# Patient Record
Sex: FEMALE | Race: OTHER | Employment: UNEMPLOYED | URBAN - METROPOLITAN AREA
[De-identification: names, ages, dates, MRNs, and addresses within clinical notes are randomized per-mention and may not be internally consistent; named-entity substitution may affect disease eponyms.]

---

## 2017-09-08 ENCOUNTER — HOSPITAL ENCOUNTER (EMERGENCY)
Age: 32
Discharge: HOME | End: 2017-09-09
Attending: EMERGENCY MEDICINE | Admitting: EMERGENCY MEDICINE
Payer: SELF-PAY

## 2017-09-08 DIAGNOSIS — R42 DIZZINESS: ICD-10-CM

## 2017-09-08 DIAGNOSIS — R20.2 PARESTHESIA: ICD-10-CM

## 2017-09-08 DIAGNOSIS — R07.9 RIGHT-SIDED CHEST PAIN: Primary | ICD-10-CM

## 2017-09-08 DIAGNOSIS — R11.2 NON-INTRACTABLE VOMITING WITH NAUSEA, UNSPECIFIED VOMITING TYPE: ICD-10-CM

## 2017-09-08 LAB
ALBUMIN SERPL-MCNC: 3.1 G/DL (ref 3.5–5)
ALBUMIN/GLOB SERPL: 0.8 {RATIO} (ref 1.1–2.2)
ALP SERPL-CCNC: 53 U/L (ref 45–117)
ALT SERPL-CCNC: 21 U/L (ref 12–78)
ANION GAP SERPL CALC-SCNC: 7 MMOL/L (ref 5–15)
APPEARANCE UR: ABNORMAL
AST SERPL-CCNC: 9 U/L (ref 15–37)
BACTERIA URNS QL MICRO: NEGATIVE /HPF
BASOPHILS # BLD: 0 K/UL (ref 0–0.1)
BASOPHILS NFR BLD: 0 % (ref 0–1)
BILIRUB SERPL-MCNC: 0.4 MG/DL (ref 0.2–1)
BILIRUB UR QL: NEGATIVE
BUN SERPL-MCNC: 10 MG/DL (ref 6–20)
BUN/CREAT SERPL: 17 (ref 12–20)
CALCIUM SERPL-MCNC: 8.6 MG/DL (ref 8.5–10.1)
CHLORIDE SERPL-SCNC: 102 MMOL/L (ref 97–108)
CO2 SERPL-SCNC: 29 MMOL/L (ref 21–32)
COLOR UR: ABNORMAL
CREAT SERPL-MCNC: 0.6 MG/DL (ref 0.55–1.02)
EOSINOPHIL # BLD: 0.1 K/UL (ref 0–0.4)
EOSINOPHIL NFR BLD: 1 % (ref 0–7)
EPITH CASTS URNS QL MICRO: ABNORMAL /LPF
ERYTHROCYTE [DISTWIDTH] IN BLOOD BY AUTOMATED COUNT: 13.2 % (ref 11.5–14.5)
GLOBULIN SER CALC-MCNC: 3.7 G/DL (ref 2–4)
GLUCOSE SERPL-MCNC: 187 MG/DL (ref 65–100)
GLUCOSE UR STRIP.AUTO-MCNC: >1000 MG/DL
HCT VFR BLD AUTO: 35.4 % (ref 35–47)
HGB BLD-MCNC: 12.2 G/DL (ref 11.5–16)
HGB UR QL STRIP: NEGATIVE
HYALINE CASTS URNS QL MICRO: ABNORMAL /LPF (ref 0–5)
KETONES UR QL STRIP.AUTO: ABNORMAL MG/DL
LEUKOCYTE ESTERASE UR QL STRIP.AUTO: NEGATIVE
LIPASE SERPL-CCNC: 112 U/L (ref 73–393)
LYMPHOCYTES # BLD: 3.1 K/UL (ref 0.8–3.5)
LYMPHOCYTES NFR BLD: 26 % (ref 12–49)
MCH RBC QN AUTO: 27.7 PG (ref 26–34)
MCHC RBC AUTO-ENTMCNC: 34.5 G/DL (ref 30–36.5)
MCV RBC AUTO: 80.3 FL (ref 80–99)
MONOCYTES # BLD: 0.8 K/UL (ref 0–1)
MONOCYTES NFR BLD: 7 % (ref 5–13)
NEUTS SEG # BLD: 7.6 K/UL (ref 1.8–8)
NEUTS SEG NFR BLD: 66 % (ref 32–75)
NITRITE UR QL STRIP.AUTO: NEGATIVE
PH UR STRIP: 6 [PH] (ref 5–8)
PLATELET # BLD AUTO: 288 K/UL (ref 150–400)
POTASSIUM SERPL-SCNC: 3.5 MMOL/L (ref 3.5–5.1)
PROT SERPL-MCNC: 6.8 G/DL (ref 6.4–8.2)
PROT UR STRIP-MCNC: NEGATIVE MG/DL
RBC # BLD AUTO: 4.41 M/UL (ref 3.8–5.2)
RBC #/AREA URNS HPF: ABNORMAL /HPF (ref 0–5)
SODIUM SERPL-SCNC: 138 MMOL/L (ref 136–145)
SP GR UR REFRACTOMETRY: 1.03 (ref 1–1.03)
UA: UC IF INDICATED,UAUC: ABNORMAL
UROBILINOGEN UR QL STRIP.AUTO: 1 EU/DL (ref 0.2–1)
WBC # BLD AUTO: 11.7 K/UL (ref 3.6–11)
WBC URNS QL MICRO: ABNORMAL /HPF (ref 0–4)

## 2017-09-08 PROCEDURE — 74011250636 HC RX REV CODE- 250/636: Performed by: EMERGENCY MEDICINE

## 2017-09-08 PROCEDURE — 93005 ELECTROCARDIOGRAM TRACING: CPT

## 2017-09-08 PROCEDURE — 83690 ASSAY OF LIPASE: CPT | Performed by: EMERGENCY MEDICINE

## 2017-09-08 PROCEDURE — 81025 URINE PREGNANCY TEST: CPT

## 2017-09-08 PROCEDURE — 96375 TX/PRO/DX INJ NEW DRUG ADDON: CPT

## 2017-09-08 PROCEDURE — 85025 COMPLETE CBC W/AUTO DIFF WBC: CPT | Performed by: EMERGENCY MEDICINE

## 2017-09-08 PROCEDURE — 96361 HYDRATE IV INFUSION ADD-ON: CPT

## 2017-09-08 PROCEDURE — 81001 URINALYSIS AUTO W/SCOPE: CPT | Performed by: EMERGENCY MEDICINE

## 2017-09-08 PROCEDURE — 80053 COMPREHEN METABOLIC PANEL: CPT | Performed by: EMERGENCY MEDICINE

## 2017-09-08 PROCEDURE — 99285 EMERGENCY DEPT VISIT HI MDM: CPT

## 2017-09-08 PROCEDURE — 36415 COLL VENOUS BLD VENIPUNCTURE: CPT | Performed by: EMERGENCY MEDICINE

## 2017-09-08 PROCEDURE — 96374 THER/PROPH/DIAG INJ IV PUSH: CPT

## 2017-09-08 RX ORDER — MECLIZINE HCL 12.5 MG 12.5 MG/1
25 TABLET ORAL
Status: COMPLETED | OUTPATIENT
Start: 2017-09-08 | End: 2017-09-08

## 2017-09-08 RX ORDER — KETOROLAC TROMETHAMINE 30 MG/ML
30 INJECTION, SOLUTION INTRAMUSCULAR; INTRAVENOUS
Status: COMPLETED | OUTPATIENT
Start: 2017-09-08 | End: 2017-09-08

## 2017-09-08 RX ORDER — ONDANSETRON 2 MG/ML
4 INJECTION INTRAMUSCULAR; INTRAVENOUS
Status: COMPLETED | OUTPATIENT
Start: 2017-09-08 | End: 2017-09-08

## 2017-09-08 RX ADMIN — KETOROLAC TROMETHAMINE 30 MG: 30 INJECTION, SOLUTION INTRAMUSCULAR at 23:14

## 2017-09-08 RX ADMIN — ONDANSETRON 4 MG: 2 INJECTION INTRAMUSCULAR; INTRAVENOUS at 23:14

## 2017-09-08 RX ADMIN — SODIUM CHLORIDE 1000 ML: 900 INJECTION, SOLUTION INTRAVENOUS at 23:17

## 2017-09-08 RX ADMIN — MECLIZINE 25 MG: 12.5 TABLET ORAL at 23:13

## 2017-09-09 ENCOUNTER — APPOINTMENT (OUTPATIENT)
Dept: GENERAL RADIOLOGY | Age: 32
End: 2017-09-09
Attending: EMERGENCY MEDICINE
Payer: SELF-PAY

## 2017-09-09 VITALS
RESPIRATION RATE: 16 BRPM | SYSTOLIC BLOOD PRESSURE: 141 MMHG | HEIGHT: 59 IN | HEART RATE: 94 BPM | OXYGEN SATURATION: 97 % | TEMPERATURE: 97.8 F | DIASTOLIC BLOOD PRESSURE: 79 MMHG | WEIGHT: 172.6 LBS | BODY MASS INDEX: 34.8 KG/M2

## 2017-09-09 LAB
ATRIAL RATE: 79 BPM
CALCULATED P AXIS, ECG09: 36 DEGREES
CALCULATED R AXIS, ECG10: 57 DEGREES
CALCULATED T AXIS, ECG11: -3 DEGREES
DIAGNOSIS, 93000: NORMAL
P-R INTERVAL, ECG05: 174 MS
Q-T INTERVAL, ECG07: 332 MS
QRS DURATION, ECG06: 82 MS
QTC CALCULATION (BEZET), ECG08: 380 MS
VENTRICULAR RATE, ECG03: 79 BPM

## 2017-09-09 PROCEDURE — 74011250636 HC RX REV CODE- 250/636: Performed by: EMERGENCY MEDICINE

## 2017-09-09 PROCEDURE — 71020 XR CHEST PA LAT: CPT

## 2017-09-09 RX ORDER — MORPHINE SULFATE 2 MG/ML
4 INJECTION, SOLUTION INTRAMUSCULAR; INTRAVENOUS
Status: COMPLETED | OUTPATIENT
Start: 2017-09-09 | End: 2017-09-09

## 2017-09-09 RX ORDER — ONDANSETRON 8 MG/1
8 TABLET, ORALLY DISINTEGRATING ORAL
Qty: 12 TAB | Refills: 0 | Status: SHIPPED | OUTPATIENT
Start: 2017-09-09 | End: 2017-09-16

## 2017-09-09 RX ORDER — MECLIZINE HYDROCHLORIDE 25 MG/1
25 TABLET ORAL
Qty: 30 TAB | Refills: 0 | Status: SHIPPED | OUTPATIENT
Start: 2017-09-09 | End: 2017-09-19

## 2017-09-09 RX ORDER — GLIPIZIDE 5 MG/1
5 TABLET ORAL 2 TIMES DAILY
COMMUNITY

## 2017-09-09 RX ADMIN — MORPHINE SULFATE 4 MG: 2 INJECTION, SOLUTION INTRAMUSCULAR; INTRAVENOUS at 00:43

## 2017-09-09 NOTE — DISCHARGE INSTRUCTIONS
Dolor de pecho: Instrucciones de cuidado - [ Chest Pain: Care Instructions ]  Instrucciones de cuidado  El dolor de pecho puede tener muchas causas. Algunas no son graves y mejorarán por sí solas en pocos días. Ha algunos tipos de dolor de pecho requieren más pruebas y [place]. Es posible que peters médico le haya recomendado devika visita de seguimiento dentro de las 8 a 12 horas siguientes. Si no mejora, es posible que necesite [address] pruebas o [place]. Aunque peters médico le haya dado de jaiden, es necesario que esté atento a cualquier problema que se presente. El médico le hizo un cuidadoso chequeo, ha a veces los problemas pueden aparecer posteriormente. Si tiene nuevos síntomas o éstos no mejoran, obtenga atención médica de inmediato. Si tiene dolor o presión en el pecho que empeora o es diferente y que dura más de 5 minutos, o se desmayó (perdió el conocimiento), llame al 911 o busque otra ayuda de emergencia de inmediato. Acudir a devika consulta médica es sólo un paso en peters tratamiento. Aunque se sienta mejor, todavía deberá hacer lo que peters médico le recomiende, amy asistir a todas las visitas de seguimiento sugeridas y munira los medicamentos exactamente [employer]	[place] fueron indicados. [place] le ayudará a recuperarse y prevenir problemas futuros. ¿Cómo puede cuidarse en el hogar? · Descanse hasta que se sienta mejor. · [place] mayank medicamentos exactamente amy le fueron recetados. Llame a peters médico si manas estar teniendo problemas con peters medicamento. · No conduzca después de munira un analgésico (medicamento para el dolor) recetado. ¿Cuándo debe pedir ayuda? Llame al 911 si:  · Se desmayó (perdió el conocimiento). · Tiene graves dificultades para respirar. · Tiene síntomas de un ataque al corazón. Estos podrían incluir:  ¨ Dolor o presión en el pecho, o devika sensación extraña en el pecho. ¨ Sudoración. ¨ Falta de aire. ¨ Náuseas o vómito.   ¨ Dolor, presión o devika sensación extraña en la espalda, el mimi, la mandíbula, la parte superior del abdomen o en garcia o ambos hombros o brazos. ¨ Aturdimiento o debilidad repentina. ¨ Latidos del corazón rápidos o irregulares. Después de llamar al 911, es posible que el operador le diga que mastique 1 aspirina para adultos o de 2 a 4 aspirinas de dosis baja. Espere a devika ambulancia. No intente conducir usted mismo. Llame hoy a peters médico si:  · Tiene cualquier dificultad para respirar. · El dolor en el pecho empeora. · Siente mareos o aturdimiento, o que está a punto de [place]. · No mejora amy se esperaba. · Tiene dolor de pecho nuevo o diferente. ¿Dónde puede encontrar más información en inglés? Victoria Hogan a http://tyron-mario.info/. Escriba A120 en la búsqueda para aprender más acerca de \"Dolor de pecho: Instrucciones de cuidado - [ Chest Pain: Care Instructions ]. \"  Revisado: 20 marzo, 2017  Versión del contenido: 11.3  © 7147-4691 Healthwise, Incorporated. Las instrucciones de cuidado fueron adaptadas bajo licencia por [employer] (which disclaims liability or warranty for this information). Si usted tiene [place] afección médica o sobre estas instrucciones, siempre pregunte a peters profesional de pedro. Healthwise, Incorporated niega toda garantía o responsabilidad por peters uso de esta información. Dolor abdominal: Instrucciones de cuidado - [ Abdominal Pain: Care Instructions ]  Instrucciones de cuidado    El dolor abdominal tiene muchas causas posibles. Algunas de ellas no son graves y mejoran por sí solas en unos días. Otras requieren Rhina Sean y [place]. Si peters dolor continúa o [place], necesitará devika nueva revisión y [place] pruebas para determinar qué pasa. Es posible que necesite cirugía para corregir el problema. No ignore nuevos síntomas, amy fiebre, náuseas y [place], [facility], dolor que MAYTE o Mg. Podrían ser señales de un problema más grave.   Peters médico puede haberle recomendado devika consulta de Rafat Rowlandn las 8 o 12 horas siguientes. Si no se siente mejor, es posible que requiera [address] o [address] El médico lo burgos revisado minuciosamente, sybil puede kindra problemas más tarde. Si nota algún problema o síntomas nuevos, busque tratamiento médico inmediatamente. La atención de seguimiento es devika parte clave de peters tratamiento y seguridad. Asegúrese de hacer y acudir a todas las citas, y llame a peters médico si está teniendo problemas. También es devika buena idea saber los resultados de los exámenes y mantener devika lista de los medicamentos que margaret. ¿Cómo puede cuidarse en el hogar? · Descanse hasta que se sienta mejor. · Para prevenir la deshidratación, wendy abundantes líquidos, suficientes para que peters orina sea de color amarillo soren o transparente amy el agua. Elija beber agua y otros líquidos florida sin cafeína hasta que se sienta mejor. Si tiene [facility], del corazón o del hígado y tiene que Charmaine's líquidos, hable con peters médico antes de aumentar peters consumo. · Si tiene [employer], coma alimentos suaves, amy arroz, pan nii seco o galletas saladas, bananas (plátanos) y puré de [place]. Trate de comer varias comidas pequeñas al día en lugar de dos o bud grandes. · Espere hasta 48 horas después de que todos los síntomas hayan desaparecido antes de comer alimentos condimentados, alcohol y bebidas que contengan cafeína. · No consuma alimentos ricos en grasa. · Evite medicamentos antiinflamatorios amy aspirina, ibuprofeno (Advil, Motrin) y naproxeno (Aleve). Pueden causar [employer]. Dígale a peters médico si está tomando aspirina diariamente debido a otro problema de pedro. ¿Cuándo debe pedir ayuda? Llame al 911 en cualquier momento que considere que necesita atención de emergencia. Por ejemplo, llame si:  · Se desmayó (perdió el conocimiento).   · [address] son de color rojizo o muy sanguinolentas (con tripp). · Vomita tripp o algo parecido a granos de café molido. · Tiene dolor abdominal nuevo e intenso. Llame a peters médico ahora mismo o busque atención médica inmediata si:  · Peters dolor empeora, sobre todo si se concentra en devika aydee parte del vientre. · Vuelve a tener fiebre o tiene fiebre más jaiden. · Ruth heces son negruzcas y parecidas al alquitrán o tienen rastros de [place]. · Tiene sangrado vaginal inesperado. · Tiene síntomas de devika infección del tracto urinario. Estos podrían incluir:  ¨ Dolor al [place]. ¨ Orinar con más frecuencia que lo habitual.  ¨ Tripp en la [place]. · Siente mareos o aturdimiento, o que está a punto de [place]. Preste especial atención a los cambios en peters pedro y asegúrese de comunicarse con peters médico si:  · No está mejorando después de 1 día (24 horas). ¿Dónde puede encontrar más información en inglés? Sasha Almaraz a http://tyron-mario.info/. Meseret Casarez [id number] en la búsqueda para aprender más acerca de \"Dolor abdominal: Instrucciones de cuidado - [ Abdominal Pain: Care Instructions ]. \"  Revisado: 2017  Versión del contenido: 11.3  © 1454-9995 Healthwise, Incorporated. Las instrucciones de cuidado fueron adaptadas bajo licencia por [employer] (which disclaims liability or warranty for this information). Si usted tiene [place] afección médica o sobre estas instrucciones, siempre pregunte a peters profesional de pedro. Healthwise, Incorporated niega toda garantía o responsabilidad por peters uso de esta información. Mareos: Instrucciones de cuidado - [ Dizziness: Care Instructions ]  Instrucciones de cuidado  Los mareos son [place] sensación de inestabilidad o confusión en la clarisa. Son distintos al vértigo, devika sensación de que la habitación gira o de que usted se mueve o . También es distinto del aturdimiento, que es la sensación de que está a punto de desmayarse. Puede resultar difícil conocer la causa de los [place].  [place] personas se sienten mareadas cuando tienen migrañas. A veces, los episodios gripales pueden hacer que se sienta mareado. Algunas afecciones médicas, amy los problemas cardíacos o la presión arterial jaiden, pueden hacer que se sienta mareado. Muchos medicamentos pueden causar mareos, amy los que se usan para la presión arterial jaiden, el dolor o la ansiedad. Si es un medicamento el que está causando los síntomas, peters médico podría recomendarle que lo cambie o deje de tomarlo. Si es un problema cardíaco, podría necesitar medicamentos para ayudar a que peters corazón funcione mejor. Si no hay razón aparente para los síntomas, peters médico podría sugerir vigilar y esperar michelle un tiempo para tacos si los mareos desaparecen por sí solos. La atención de seguimiento es devika parte clave de peters tratamiento y seguridad. Asegúrese de hacer y acudir a todas las citas, y llame a peters médico si está teniendo problemas. También es devika buena idea saber los resultados de mayank exámenes y mantener devika lista de los medicamentos que margaret. ¿Cómo puede cuidarse en el hogar? · Si peters médico le recomienda o receta medicamentos, tómelos exactamente según las indicaciones. Llame a peters médico si manas estar teniendo un problema con peters medicamento. · No conduzca mientras se sienta mareado. · Trate de prevenir las caídas. Algunas medidas que puede munira son:  Carolyn Patee Usar tapetes antideslizantes, agregar agarraderas cerca de la amanda y usar luces nocturnas. ¨ Ordenar peters casa de minnie manera que en los senderos no haya nada con lo que se pueda tropezar. ¨ Avisarles a familiares y [facility] que se ha estado sintiendo [place]. [place] les servirá para saber cómo ayudarle. ¿Cuándo debe pedir ayuda? Llame al 911 en cualquier momento que considere que necesita atención de [place]. Por ejemplo, llame si:  · Se desmayó (perdió el conocimiento).   · Tiene mareos junto con síntomas de un ataque cardíaco. Estos pueden incluir:  ¨ Dolor de pecho, o presión o devika sensación extraña en el pecho. ¨ Sudoración. ¨ Falta de aire. ¨ Náuseas o vómito. ¨ Dolor, presión, o devika sensación extraña en la espalda, el mimi, la mandíbula o el abdomen superior, o en garcia o ambos hombros o brazos. ¨ Aturdimiento o debilidad repentina. ¨ Un latido cardíaco rápido o irregular. · Tiene síntomas de un ataque cerebral. Estos pueden incluir:  ¨ Entumecimiento, hormigueo, debilidad o parálisis repentinos en la irasema, el brazo o la pierna, sobre todo si ocurre en un solo lado del cuerpo. ¨ Cambios súbitos en la vista. ¨ Problemas repentinos para hablar. ¨ Confusión súbita o dificultad repentina para comprender frases sencillas. ¨ Problemas repentinos para caminar o mantener el equilibrio. ¨ Un dolor de clarisa intenso y repentino, distinto a los jyoti de clarisa anteriores. Llame a peters médico ahora mismo o busque atención médica inmediata si:  · Se siente mareado y [facility], dolor [facility] o zumbido [email or web address] oídos. · Tiene nuevas náuseas y vómito o [address] · Ruth mareos no desaparecen o regresan. Preste especial atención a los cambios en peters pedro y asegúrese de comunicarse con peters médico si:  · No mejora amy se esperaba. ¿Dónde puede encontrar más información en inglés? Stepan Carlos a http://tyron-mario.info/. Brianda Puckett [id number] en la búsqueda para aprender más acerca de \"Mareos: Instrucciones de cuidado - [ Dizziness: Care Instructions ]. \"  Revisado: 20 marzo, 2017  Versión del contenido: 11.3  © 6357-4928 [employer]. Las instrucciones de cuidado fueron adaptadas bajo licencia por [employer] (which disclaims liability or warranty for this information). Si usted tiene [place] afección médica o sobre estas instrucciones, siempre pregunte a peters profesional de pedro. [employer] niega toda garantía o responsabilidad por peters uso de esta información.        Náuseas y vómito: Instrucciones de cuidado - [ Nausea and Vomiting: Care Instructions ]  Instrucciones de cuidado    Cuando tiene náuseas, podría sentirse débil y sudoroso y notar mucha saliva en peters boca. Las náuseas suelen [employer]. La mayoría de las veces no hay que preocuparse por las náuseas y [address] [facility], sybil estos pueden ser signos de [facility]. Dos causas comunes de náuseas y vómito son la gastroenteritis viral y la intoxicación por alimentos. Las náuseas y el vómito por gastroenteritis viral, por lo general, empiezan a mejorar en unas 24 horas. Las náuseas y el vómito debido a intoxicación por alimentos podrían durar de 12 a 48 horas. El médico lo ha revisado minuciosamente, sybil puede desarrollar problemas más tarde. Si nota algún problema o síntomas nuevos, busque tratamiento médico inmediatamente. La atención de seguimiento es devika parte clave de peters tratamiento y seguridad. Asegúrese de hacer y acudir a todas las citas, y llame a peters médico si está teniendo problemas. También es devika buena idea saber los resultados de los exámenes y mantener devika lista de los medicamentos que margaret. ¿Cómo puede cuidarse en el hogar? · Para prevenir la deshidratación, wendy abundantes líquidos, suficientes para que peters orina sea de color amarillo soren o sara amy el agua. Opte por beber agua y otros líquidos florida sin cafeína hasta que se sienta mejor. Si tiene devika enfermedad del riñón, del corazón o del hígado y tiene que [place]'s líquidos, hable con peters médico antes de aumentar peters consumo. · Permanezca en la cama hasta que se sienta mejor. · Cuando pueda comer, empiece a consumir sopas claras (caldos), alimentos suaves y líquidos hasta que todos los síntomas hayan desaparecido por un período de 12 a 48 horas. Otras elecciones buenas incluyen pan nii seco, galletas saladas, cereal cocido y postre de gelatina, amy Jell-O.  ¿Cuándo debe pedir ayuda? Llame al 911 toda vez que piense que puede necesitar atención de emergencia.  Por ejemplo, llame si:  · Se desmayó (perdió el conocimiento). Llame a peters médico ahora mismo o busque atención médica inmediata si:  · Tiene síntomas de deshidratación, tales amy:  ¨ Ojos secos y boca seca. ¨ Orina solo poca cantidad de color oscuro. ¨ Tiene más sed de lo normal.  · Tiene dolor abdominal nuevo o que empeora. · Tiene fiebre nueva o que aumenta. · Vomita tripp o algo parecido a granos de café molido. Preste especial atención a los cambios en peters pedro y asegúrese de comunicarse con peters médico si:  · Tiene náusea y vómito continuos. · El vómito está empeorando. · El vómito dura más de 2 días. · No mejora amy se esperaba. ¿Dónde puede encontrar más información en inglés? Nyla Cabrales a http://tyron-mario.info/. Magi Messina [id number] en la búsqueda para aprender más acerca de \"Náuseas y vómito: Instrucciones de cuidado - [ Nausea and Vomiting: Care Instructions ]. \"  Revisado: 20 marzo, 2017  Versión del contenido: 11.3  © 9375-0482 Healthwise, Incorporated. Las instrucciones de cuidado fueron adaptadas bajo licencia por [employer] (which disclaims liability or warranty for this information). Si usted tiene [place] afección médica o sobre estas instrucciones, siempre pregunte a peters profesional de pedro. Healthwise, Incorporated niega toda garantía o responsabilidad por peters uso de esta información. Entumecimiento y hormigueo: Instrucciones de cuidado - [ Numbness and Tingling: Care Instructions ]  Instrucciones de cuidado  Muchas cosas pueden causar entumecimiento u hormigueo. Devika hinchazón puede ejercer presión [employer]. [place] podría causarle pérdida de sensación o que tenga devika sensación de hormigueo en parte de peters cuerpo. Los nervios pueden ser dañados por traumatismos, toxinas o enfermedades amy diabetes o esclerosis múltiple (MS, por mayank siglas en inglés). Sin embargo, algunas veces la causa no está sara.   Si no hay razón aparente para los síntomas, y no tiene ningún [address] médico podría sugerir vigilar y esperar michelle un tiempo para tacos si el entumecimiento o el hormigueo desaparecen por sí solos. Es posible que peters médico quiera que usted se geni análisis de tripp o pruebas de los nervios para encontrar la causa de mayank síntomas. La atención de seguimiento es devika parte clave de peters tratamiento y seguridad. Asegúrese de hacer y acudir a todas las citas, y llame a peters médico si está teniendo problemas. También es devika buena idea saber los resultados de mayank exámenes y mantener devika lista de los medicamentos que margaret. ¿Cómo puede cuidarse en el hogar? · Si peters médico le receta medicamentos, tómelos exactamente según las indicaciones. Llame a peters médico si manas estar teniendo un problema con mayank medicamentos. · Si tiene alguna hinchazón, colóquese hielo o devika compresa fría en la perico de 10 a 20 minutos por vez. Póngase un paño moralez entre el hielo y la piel. ¿Cuándo debe pedir ayuda? Llame al 911 en cualquier momento que considere que necesita atención de [place]. Por ejemplo, llame si:  · Tiene debilidad, entumecimiento u hormigueo en ambas piernas. · Pierde el control de los intestinos o de la vejiga. · Tiene síntomas de un ataque cerebral. Estos pueden incluir:  ¨ Entumecimiento, hormigueo, debilidad o parálisis repentinos en la irasema, el brazo o la pierna, sobre todo si ocurre en un solo lado del cuerpo. ¨ Cambios súbitos en la vista. ¨ Problemas repentinos para hablar. ¨ Confusión súbita o dificultad repentina para comprender frases sencillas. ¨ Problemas repentinos para caminar o mantener el equilibrio. ¨ Un dolor de clarisa intenso y repentino, distinto a los jyoti de clarisa anteriores. Preste especial atención a los cambios en peters pedro y asegúrese de comunicarse con peters médico si tiene cualquier problema, o si:  · No mejora amy se esperaba. ¿Dónde puede encontrar más información en inglés? [place] a http://tyron-mario.info/.   Bubbaa Farley [id number] en la búsqueda para aprender más acerca de \"Entumecimiento y hormigueo: Instrucciones de cuidado - [ Numbness and Tingling: Care Instructions ]. \"  Revisado: 14 octubre, 2016  Versión del contenido: 11.3  © 9623-4932 Healthwise, Incorporated. Las instrucciones de cuidado fueron adaptadas bajo licencia por [employer] (which disclaims liability or warranty for this information). Si usted tiene [place] afección médica o sobre estas instrucciones, siempre pregunte a peters profesional de pedro. Healthwise, Incorporated niega toda garantía o responsabilidad por peters uso de esta información.

## 2017-09-09 NOTE — ED TRIAGE NOTES
Patient arrives to ED with c/o chest pain, bilat leg numbness bilat arm numbness and vomiting x 1 day, patient states sharp pains to lower right chest, non radiating.

## 2017-09-09 NOTE — ED PROVIDER NOTES
HPI Comments: 33 y.o. female with past medical history significant for DM who presents from home with chief complaint of dizziness. Pt reports awaking at 0100 this morning with dizziness. She describes symptoms as \"room spinning\" which has remained constant accompanied by nausea and 1 episode of vomiting. Pt also c/o mild headache, abdominal pain (RUQ), SOB, generalized weakness and \"tingling\" to her arms \"shoulder to elbow\" and legs \"knee to feet\". Pt denies numbness, diarrhea, constipation, cough, congestion, rhinorrhea. No recent travel or flights. No birth control or hormones. There are no other acute medical concerns at this time. PCP: None    Note written by Imani Mayen, as dictated by Manuela Boudreaux MD 11:02 PM    The history is provided by the patient. A  was used. Past Medical History:   Diagnosis Date    Diabetes [facility])        Past Surgical History:   Procedure Laterality Date    HX GYN               History reviewed. No pertinent family history. Social History     Social History    Marital status: SINGLE     Spouse name: N/A    Number of children: N/A    Years of education: N/A     Occupational History    Not on file. Social History Main Topics    Smoking status: Never Smoker    Smokeless tobacco: Not on file    Alcohol use No    Drug use: No    Sexual activity: Not on file     Other Topics Concern    Not on file     Social History Narrative         ALLERGIES: Review of patient's allergies indicates no known allergies. Review of Systems   Constitutional: Negative for appetite change and fever. HENT: Negative for congestion, nosebleeds and sore throat. Eyes: Negative for discharge. Respiratory: Positive for shortness of breath. Negative for cough. Cardiovascular: Negative for chest pain. Gastrointestinal: Positive for abdominal pain (RUQ), nausea and vomiting. Negative for diarrhea.    Genitourinary: Negative for dysuria. Musculoskeletal: Negative. Skin: Negative for rash. Neurological: Positive for weakness and headaches.        + \"tingling\" to arms and legs   Hematological: Negative for adenopathy. Psychiatric/Behavioral: Negative. All other systems reviewed and are negative. Vitals:    09/08/17 2200   BP: 136/83   Pulse: 92   Resp: 16   Temp: 98.2 °F (36.8 °C)   SpO2: 97%   Weight: 78.3 kg (172 lb 9.6 oz)   Height: 4' 11\" (1.499 m)            Physical Exam   Constitutional: She is oriented to person, place, and time. She appears well-developed and well-nourished. HENT:   Head: Normocephalic and atraumatic. Mouth/Throat: Oropharynx is clear and moist.   Eyes: Conjunctivae are normal.   Neck: Normal range of motion. Neck supple. Cardiovascular: Normal rate, regular rhythm and normal heart sounds. Pulmonary/Chest: Effort normal and breath sounds normal.   Abdominal: Soft. Bowel sounds are normal. There is tenderness (mild) in the right upper quadrant. There is negative Kirkpatrick's sign. Musculoskeletal: Normal range of motion. She exhibits no edema or tenderness. Neurological: She is alert and oriented to person, place, and time. Skin: Skin is warm and dry. Psychiatric: She has a normal mood and affect. Her behavior is normal.   Nursing note and vitals reviewed. Note written by Imani Lao, as dictated by Reji Milner MD 11:06 PM    [facility]  ED Course       Procedures     ED EKG interpretation:  Rhythm: normal sinus rhythm; and regular . Rate (approx.): 79; Axis: normal; P wave: normal; QRS interval: normal ; ST/T wave: normal;  This EKG was interpreted by Reji Milner MD,ED Provider. A/P:  1. Dizziness - suspect vertigo. Meclizine prn.  2. N/V - Zofran prn  3. R chest/RUQ pain - resolved      Patient's results have been reviewed with them.   Patient and/or family have verbally conveyed their understanding and agreement of the patient's signs, symptoms, diagnosis, treatment and prognosis and additionally agree to follow up as recommended or return to the Emergency Room should their condition change prior to follow-up. Discharge instructions have also been provided to the patient with some educational information regarding their diagnosis as well a list of reasons why they would want to return to the ER prior to their follow-up appointment should their condition change.

## 2017-09-09 NOTE — ED NOTES
Pt states that she is still feeling midepigastric pain. Pt rates 7/10 and states that it is a throbbing pain.

## 2017-09-09 NOTE — ED NOTES
Discharge instructions given to pt in [place]. All questions answered and pt verbalized understanding. V/S stable @ time of discharge. No lines or drains in place. Pt ambulatory out of unit.

## 2017-09-11 LAB — HCG UR QL: NEGATIVE

## 2020-10-08 ENCOUNTER — VIRTUAL VISIT (OUTPATIENT)
Dept: FAMILY MEDICINE CLINIC | Age: 35
End: 2020-10-08

## 2020-10-08 DIAGNOSIS — K04.7 ABSCESS, DENTAL: Primary | ICD-10-CM

## 2020-10-08 PROCEDURE — 99442 PR PHYS/QHP TELEPHONE EVALUATION 11-20 MIN: CPT | Performed by: PHYSICIAN ASSISTANT

## 2020-10-08 RX ORDER — CLINDAMYCIN HYDROCHLORIDE 300 MG/1
300 CAPSULE ORAL 3 TIMES DAILY
Qty: 30 CAP | Refills: 0 | Status: SHIPPED | OUTPATIENT
Start: 2020-10-08 | End: 2020-10-18

## 2020-10-08 NOTE — PROGRESS NOTES
Avs discussed with Alba Epps by Discharge Nurse Radha Tomlin LPN. Good rx coupon sent to pt via text message at pt's request. Patient verbalized understanding and has no further questions.   Radha Tomlin LPN

## 2020-10-08 NOTE — PROGRESS NOTES
She currently a pt at [facility]  Chief Complaint   Patient presents with    Headache     x 1 wk, 10/10, c/o rightsided facial gum pain,      Currently taking 2 different insulins, one in the morning and 1in the evening.

## 2023-05-25 RX ORDER — DICLOFENAC POTASSIUM 50 MG/1
25 TABLET, FILM COATED ORAL 3 TIMES DAILY PRN
COMMUNITY

## 2023-05-25 RX ORDER — GLIPIZIDE 5 MG/1
5 TABLET ORAL 2 TIMES DAILY
COMMUNITY

## 2023-05-25 RX ORDER — ACETAMINOPHEN 500 MG
TABLET ORAL EVERY 6 HOURS PRN
COMMUNITY

## 2023-05-25 RX ORDER — OXYCODONE HYDROCHLORIDE AND ACETAMINOPHEN 5; 325 MG/1; MG/1
1 TABLET ORAL EVERY 4 HOURS PRN
COMMUNITY
Start: 2016-09-24